# Patient Record
(demographics unavailable — no encounter records)

---

## 2025-01-08 NOTE — END OF VISIT
[FreeTextEntry3] : I saw and evaluated the patient and discussed the care with the NP provider above on 09/26/2024 . I agree with the findings and plan as documented in the note above.  She denies any anginal symptoms. Clinically she is euvolemic on exam. still with severe as. no tavr eval. She will continue bp meds.

## 2025-01-08 NOTE — CARDIOLOGY SUMMARY
[de-identified] : Sinus Rhythm PACs Voltage criteria for LVH (R(I)+S(III) exceeds 2.50 mV) -Nonspecific QRS widening. -T-abnormality -Lateral ischemia. [de-identified] : 4/2024 SR freq PSVT likely PAF and PAT.  [de-identified] : 4/2022 MAC mild MR, moderate to severe AS, normal LV function.  4/2023 normal LV function severe AS.    [de-identified] :  CABG in 2011 with LIMA to LAD and vein to OM,

## 2025-01-08 NOTE — HISTORY OF PRESENT ILLNESS
[FreeTextEntry1] : 89-year-old woman with a history of hypertension, hyperlipidemia, CAD status post CABG in 2011 with LIMA to LAD and vein to OM, carotid disease, nephrolithiasis who presents today for a follow-up visit.  Since her last visit she has been feeling well. Her FORTUNE has improved with her mediations. Denies any palpitations, chest pain, PND, orthopnea, lower extremity edema, near syncope, syncope, stroke-like symptoms. No reported melena, hematochezia or hematemesis. Medication reconciliation performed. She is compliant with her medications.

## 2025-01-08 NOTE — PHYSICAL EXAM
[Tachycardia] : tachycardic [Rhythm Regular] : regular [Normal S1] : normal S1 [Normal S2] : normal S2 [III] : a grade 3 [No Pitting Edema] : no pitting edema present [1+] : left 1+ [Right Carotid Bruit] : no bruit heard over the right carotid [Left Carotid Bruit] : no bruit heard over the left carotid [Normal Radial B/L] : normal radial B/L [Normal PT B/L] : normal PT B/L [Normal DP B/L] : normal DP B/L [Edema ___] : edema [unfilled] [Normal] : alert and oriented, normal memory

## 2025-01-08 NOTE — DISCUSSION/SUMMARY
[FreeTextEntry1] : 89 year Woman with a history as listed presents for a followup cardiac evaluation.  Carmencita is now maintaining SR. She has PAF.  She is now taking Lopressor 100mg q12. Given her elevated JASON-VaSC she will continue Eliquis. She denies any anginal symptoms. Clinically she is euvolemic on exam. Her EKG is unchanged from previous.  Her blood pressure is better but still elevated. She does not want to take any more meds.  Though she does admit to being anxious.  She will continue with Lopressor 100 mg every 12 and Vasotec 5 mg q12.  She will try to maintain a BP log at home. Reducing dietary salt intake advised.  Her AS is now severe by planimetry. Her DVI is still in moderate to severe territory. She is not interested in TAVR evaluation.  She does not want any significant testing or procedure as she is feeling relatively asymptomatic. She will decrease his ASA 81mg while on Eliquis for her coronary artery disease. She will continue with statin therapy to achieve maintain goal LDL<100 or ideally <70. Exercise and diet counseling was performed in order to reduce her future cardiovascular risk. She will follow-up with in 3 months. [EKG obtained to assist in diagnosis and management of assessed problem(s)] : EKG obtained to assist in diagnosis and management of assessed problem(s)

## 2025-05-01 NOTE — CARDIOLOGY SUMMARY
[de-identified] : Baseline artifact Sinus Rhythm  -Left axis -anterior fascicular block. Voltage criteria for LVH   -Nonspecific ST depression  [de-identified] : 4/2024 SR freq PSVT likely PAF and PAT.  [de-identified] : 4/2022 MAC mild MR, moderate to severe AS, normal LV function.  4/2023 normal LV function severe AS.    [de-identified] :  CABG in 2011 with LIMA to LAD and vein to OM,

## 2025-05-01 NOTE — HISTORY OF PRESENT ILLNESS
[FreeTextEntry1] : 89-year-old woman with a history of hypertension, hyperlipidemia, CAD status post CABG in 2011 with LIMA to LAD and vein to OM, carotid disease, nephrolithiasis who presents today for a follow-up visit.  Since her last visit she has been feeling well. Her FORTUNE has improved with her mediations. Walking with a cane.  Denies any palpitations, chest pain, PND, orthopnea, lower extremity edema, near syncope, syncope, stroke-like symptoms. No reported melena, hematochezia or hematemesis. Medication reconciliation performed. She is compliant with her medications.

## 2025-05-01 NOTE — PHYSICAL EXAM
[Normal Rate] : normal [Rhythm Regular] : regular [Normal S1] : normal S1 [Normal S2] : normal S2 [II] : a grade 2 [___ +] : bilateral [unfilled]U+ pretibial pitting edema [1+] : left 1+ [Right Carotid Bruit] : no bruit heard over the right carotid [Left Carotid Bruit] : no bruit heard over the left carotid [Normal PT B/L] : normal PT B/L [Normal DP B/L] : normal DP B/L [Normal] : alert and oriented, normal memory